# Patient Record
Sex: MALE | Race: WHITE
[De-identification: names, ages, dates, MRNs, and addresses within clinical notes are randomized per-mention and may not be internally consistent; named-entity substitution may affect disease eponyms.]

---

## 2019-08-03 ENCOUNTER — HOSPITAL ENCOUNTER (EMERGENCY)
Dept: HOSPITAL 83 - ED | Age: 29
Discharge: ANOTHER HOSPITAL | End: 2019-08-03
Payer: SELF-PAY

## 2019-08-03 ENCOUNTER — HOSPITAL ENCOUNTER (INPATIENT)
Age: 29
LOS: 3 days | Discharge: HOME | DRG: 637 | End: 2019-08-06
Attending: STUDENT IN AN ORGANIZED HEALTH CARE EDUCATION/TRAINING PROGRAM | Admitting: STUDENT IN AN ORGANIZED HEALTH CARE EDUCATION/TRAINING PROGRAM
Payer: MEDICARE

## 2019-08-03 ENCOUNTER — APPOINTMENT (OUTPATIENT)
Dept: GENERAL RADIOLOGY | Age: 29
DRG: 637 | End: 2019-08-03
Attending: STUDENT IN AN ORGANIZED HEALTH CARE EDUCATION/TRAINING PROGRAM
Payer: MEDICARE

## 2019-08-03 VITALS — SYSTOLIC BLOOD PRESSURE: 63 MMHG | DIASTOLIC BLOOD PRESSURE: 42 MMHG

## 2019-08-03 VITALS — DIASTOLIC BLOOD PRESSURE: 41 MMHG | SYSTOLIC BLOOD PRESSURE: 75 MMHG

## 2019-08-03 VITALS — SYSTOLIC BLOOD PRESSURE: 92 MMHG | DIASTOLIC BLOOD PRESSURE: 35 MMHG

## 2019-08-03 VITALS — DIASTOLIC BLOOD PRESSURE: 35 MMHG

## 2019-08-03 VITALS — DIASTOLIC BLOOD PRESSURE: 45 MMHG

## 2019-08-03 VITALS — DIASTOLIC BLOOD PRESSURE: 62 MMHG

## 2019-08-03 VITALS — SYSTOLIC BLOOD PRESSURE: 94 MMHG | DIASTOLIC BLOOD PRESSURE: 40 MMHG

## 2019-08-03 VITALS — HEIGHT: 67.99 IN

## 2019-08-03 VITALS — DIASTOLIC BLOOD PRESSURE: 31 MMHG

## 2019-08-03 DIAGNOSIS — E10.10 DKA, TYPE 1, NOT AT GOAL (HCC): Primary | ICD-10-CM

## 2019-08-03 DIAGNOSIS — E11.10: Primary | ICD-10-CM

## 2019-08-03 LAB
ACETAMINOPHEN LEVEL: <5 MCG/ML (ref 10–30)
ACETAMINOPHEN LEVEL: <5 MCG/ML (ref 10–30)
ALBUMIN SERPL-MCNC: 2.7 GM/DL (ref 3.1–4.5)
ALBUMIN SERPL-MCNC: 2.9 G/DL (ref 3.5–5.2)
ALP BLD-CCNC: 128 U/L (ref 40–129)
ALP SERPL-CCNC: 171 U/L (ref 45–117)
ALT SERPL W P-5'-P-CCNC: 30 U/L (ref 12–78)
ALT SERPL-CCNC: 21 U/L (ref 0–40)
AMPHETAMINES UR QL SCN: < 1000
ANION GAP SERPL CALCULATED.3IONS-SCNC: 32 MMOL/L (ref 7–16)
ANION GAP SERPL CALCULATED.3IONS-SCNC: 46 MMOL/L (ref 7–16)
APTT: 23.8 SEC (ref 24.5–35.1)
AST SERPL-CCNC: 23 U/L (ref 0–39)
AST SERPL-CCNC: 26 IU/L (ref 3–35)
B.E.: -22.4 MMOL/L (ref -3–3)
BACTERIA: ABNORMAL /HPF
BARBITURATES UR QL SCN: < 200
BASE EXCESS BLDA CALC-SCNC: -30.6 MMOL/L (ref -2–2)
BASOPHILS ABSOLUTE: 0.06 E9/L (ref 0–0.2)
BASOPHILS RELATIVE PERCENT: 0.3 % (ref 0–2)
BENZODIAZ UR QL SCN: < 200
BETA-HYDROXYBUTYRATE: >4.5 MMOL/L (ref 0.02–0.27)
BILIRUB SERPL-MCNC: <0.2 MG/DL (ref 0–1.2)
BILIRUBIN URINE: ABNORMAL
BLOOD, URINE: ABNORMAL
BUN BLDV-MCNC: 42 MG/DL (ref 6–20)
BUN BLDV-MCNC: 53 MG/DL (ref 6–20)
BUN SERPL-MCNC: 53 MG/DL (ref 7–24)
BURR CELLS BLD QL SMEAR: (no result)
BZE UR QL SCN: < 300
CALCIUM SERPL-MCNC: 6.7 MG/DL (ref 8.6–10.2)
CALCIUM SERPL-MCNC: 7.2 MG/DL (ref 8.6–10.2)
CANNABINOIDS UR QL SCN: < 50
CHLORIDE BLD-SCNC: 84 MMOL/L (ref 98–107)
CHLORIDE BLD-SCNC: 94 MMOL/L (ref 98–107)
CHLORIDE SERPL-SCNC: 65 MMOL/L (ref 98–107)
CHLORIDE URINE RANDOM: <20 MMOL/L
CK MB: 8.8 NG/ML (ref 0–7.7)
CLARITY: CLEAR
CO2: 15 MMOL/L (ref 22–29)
CO2: 4 MMOL/L (ref 22–29)
COHB: 0.1 % (ref 0–1.5)
COLOR: YELLOW
CREAT SERPL-MCNC: 2.3 MG/DL (ref 0.7–1.2)
CREAT SERPL-MCNC: 2.7 MG/DL (ref 0.7–1.2)
CREAT SERPL-MCNC: 3.81 MG/DL (ref 0.7–1.3)
CREATININE URINE: 24 MG/DL (ref 40–278)
CRITICAL: ABNORMAL
DATE ANALYZED: ABNORMAL
DATE OF COLLECTION: ABNORMAL
EOSINOPHILS ABSOLUTE: 0.06 E9/L (ref 0.05–0.5)
EOSINOPHILS RELATIVE PERCENT: 0.3 % (ref 0–6)
ERYTHROCYTE [DISTWIDTH] IN BLOOD BY AUTOMATED COUNT: 15 % (ref 0–14.5)
ETHANOL: <10 MG/DL (ref 0–0.08)
ETHANOL: <10 MG/DL (ref 0–0.08)
GFR AFRICAN AMERICAN: 33
GFR AFRICAN AMERICAN: 40
GFR NON-AFRICAN AMERICAN: 28 ML/MIN/1.73
GFR NON-AFRICAN AMERICAN: 33 ML/MIN/1.73
GLUCOSE BLD-MCNC: 1350 MG/DL (ref 74–99)
GLUCOSE BLD-MCNC: 537 MG/DL (ref 74–99)
GLUCOSE BLD-MCNC: 805 MG/DL (ref 74–99)
GLUCOSE URINE: >=1000 MG/DL
HCO3 BLDA-SCNC: 1.8 MMOL/L (ref 22–26)
HCO3: 5 MMOL/L (ref 22–26)
HCT VFR BLD AUTO: 42.2 % (ref 42–52)
HCT VFR BLD CALC: 33.3 % (ref 37–54)
HCT VFR BLD CALC: 37.1 % (ref 37–54)
HEMOGLOBIN: 11.2 G/DL (ref 12.5–16.5)
HEMOGLOBIN: 11.5 G/DL (ref 12.5–16.5)
HGB BLD-MCNC: 11.3 G/DL (ref 14–18)
HHB: 1.7 % (ref 0–5)
IMMATURE GRANULOCYTES #: 0.66 E9/L
IMMATURE GRANULOCYTES %: 3.8 % (ref 0–5)
INR BLD: 0.9
KETONES, URINE: 40 MG/DL
LAB: ABNORMAL
LACTIC ACID: 2.7 MMOL/L (ref 0.5–2.2)
LACTIC ACID: 3.7 MMOL/L (ref 0.5–2.2)
LEUKOCYTE ESTERASE, URINE: NEGATIVE
LYMPHOCYTES ABSOLUTE: 2.95 E9/L (ref 1.5–4)
LYMPHOCYTES RELATIVE PERCENT: 16.9 % (ref 20–42)
Lab: ABNORMAL
MACROCYTES BLD QL SMEAR: (no result)
MAGNESIUM: 2.5 MG/DL (ref 1.6–2.6)
MCH RBC QN AUTO: 29.1 PG (ref 26–35)
MCH RBC QN AUTO: 29.2 PG (ref 26–35)
MCH RBC QN AUTO: 29.9 PG (ref 27–31)
MCHC RBC AUTO-ENTMCNC: 26.8 G/DL (ref 33–37)
MCHC RBC AUTO-ENTMCNC: 31 % (ref 32–34.5)
MCHC RBC AUTO-ENTMCNC: 33.6 % (ref 32–34.5)
MCV RBC AUTO: 111.6 FL (ref 80–94)
MCV RBC AUTO: 86.9 FL (ref 80–99.9)
MCV RBC AUTO: 93.9 FL (ref 80–99.9)
METER GLUCOSE: 421 MG/DL (ref 74–99)
METER GLUCOSE: >500 MG/DL (ref 74–99)
METHADONE UR QL SCN: < 300
METHB: 0.3 % (ref 0–1.5)
MODE: ABNORMAL
MONOCYTES ABSOLUTE: 0.91 E9/L (ref 0.1–0.95)
MONOCYTES RELATIVE PERCENT: 5.2 % (ref 2–12)
NEUTROPHILS ABSOLUTE: 12.82 E9/L (ref 1.8–7.3)
NEUTROPHILS RELATIVE PERCENT: 73.5 % (ref 43–80)
NITRITE, URINE: NEGATIVE
NRBC BLD QL AUTO: 0 10*3/UL (ref 0–0)
O2 SATURATION: 98.3 % (ref 92–98.5)
O2HB: 97.9 % (ref 94–97)
OPERATOR ID: ABNORMAL
OPIATES UR QL SCN: < 300
OSMOLALITY: 369 MOSM/KG (ref 285–310)
PATIENT TEMP: 37 C
PCO2 BLDA: 9 MMHG (ref 35–45)
PCO2: 15.9 MMHG (ref 35–45)
PCP UR QL SCN: <  25
PDW BLD-RTO: 14.6 FL (ref 11.5–15)
PDW BLD-RTO: 14.6 FL (ref 11.5–15)
PH BLDA: 6.92 [PH] (ref 7.35–7.45)
PH BLOOD GAS: 7.11 (ref 7.35–7.45)
PH UA: 5 (ref 5–9)
PHOSPHORUS: 1.9 MG/DL (ref 2.5–4.5)
PHOSPHORUS: 6.7 MG/DL (ref 2.5–4.5)
PLATELET # BLD AUTO: 488 10*3/UL (ref 130–400)
PLATELET # BLD: 382 E9/L (ref 130–450)
PLATELET # BLD: 396 E9/L (ref 130–450)
PLATELET SUFFICIENCY: (no result)
PMV BLD AUTO: 10.7 FL (ref 7–12)
PMV BLD AUTO: 11.3 FL (ref 7–12)
PMV BLD AUTO: 11.8 FL (ref 9.6–12.3)
PO2 BLDA: 156 MMHG (ref 80–90)
PO2: 141.1 MMHG (ref 60–100)
POTASSIUM REFLEX MAGNESIUM: 4.1 MMOL/L (ref 3.5–5)
POTASSIUM SERPL-SCNC: 3.7 MMOL/L (ref 3.5–5)
POTASSIUM SERPL-SCNC: 6.6 MMOL/L (ref 3.5–5.1)
POTASSIUM, UR: 16.6 MMOL/L
PROCALCITONIN: 7.88 NG/ML (ref 0–0.08)
PROT SERPL-MCNC: 6.4 GM/DL (ref 6.4–8.2)
PROTEIN UA: ABNORMAL MG/DL
PROTHROMBIN TIME: 10 SEC (ref 9.3–12.4)
RBC # BLD AUTO: 3.78 10*6/UL (ref 4.5–5.9)
RBC # BLD: 3.83 E12/L (ref 3.8–5.8)
RBC # BLD: 3.95 E12/L (ref 3.8–5.8)
RBC # BLD: NORMAL 10*6/UL
RBC UA: ABNORMAL /HPF (ref 0–2)
SALICYLATE, SERUM: <0.3 MG/DL (ref 0–30)
SALICYLATE, SERUM: <0.3 MG/DL (ref 0–30)
SAO2 % BLDA: 98.7 % (ref 95–97)
SODIUM BLD-SCNC: 134 MMOL/L (ref 132–146)
SODIUM BLD-SCNC: 141 MMOL/L (ref 132–146)
SODIUM SERPL-SCNC: 115 MMOL/L (ref 136–145)
SODIUM URINE: 22 MMOL/L
SOURCE, BLOOD GAS: ABNORMAL
SPECIFIC GRAVITY UA: 1.01 (ref 1–1.03)
THB: 12.4 G/DL (ref 11.5–16.5)
TIME ANALYZED: 1723
TOTAL CELLS COUNTED: 100 #CELLS
TOTAL CK: 162 U/L (ref 20–200)
TOTAL CK: 234 U/L (ref 20–200)
TOTAL PROTEIN: 5.9 G/DL (ref 6.4–8.3)
TRICYCLIC ANTIDEPRESSANTS SCREEN SERUM: NEGATIVE NG/ML
TRICYCLIC ANTIDEPRESSANTS SCREEN SERUM: NEGATIVE NG/ML
TROPONIN: <0.01 NG/ML (ref 0–0.03)
UREA NITROGEN, UR: 150 MG/DL (ref 800–1666)
UROBILINOGEN, URINE: 0.2 E.U./DL
WBC # BLD: 17.5 E9/L (ref 4.5–11.5)
WBC # BLD: 24.1 E9/L (ref 4.5–11.5)
WBC NRBC COR # BLD AUTO: 29.9 10*3/UL (ref 4.8–10.8)
WBC UA: ABNORMAL /HPF (ref 0–5)

## 2019-08-03 PROCEDURE — 71045 X-RAY EXAM CHEST 1 VIEW: CPT

## 2019-08-03 PROCEDURE — 6360000002 HC RX W HCPCS

## 2019-08-03 PROCEDURE — 84145 PROCALCITONIN (PCT): CPT

## 2019-08-03 PROCEDURE — 82553 CREATINE MB FRACTION: CPT

## 2019-08-03 PROCEDURE — 84133 ASSAY OF URINE POTASSIUM: CPT

## 2019-08-03 PROCEDURE — G0480 DRUG TEST DEF 1-7 CLASSES: HCPCS

## 2019-08-03 PROCEDURE — 82550 ASSAY OF CK (CPK): CPT

## 2019-08-03 PROCEDURE — 80053 COMPREHEN METABOLIC PANEL: CPT

## 2019-08-03 PROCEDURE — 83605 ASSAY OF LACTIC ACID: CPT

## 2019-08-03 PROCEDURE — 85027 COMPLETE CBC AUTOMATED: CPT

## 2019-08-03 PROCEDURE — 85610 PROTHROMBIN TIME: CPT

## 2019-08-03 PROCEDURE — 87081 CULTURE SCREEN ONLY: CPT

## 2019-08-03 PROCEDURE — 6370000000 HC RX 637 (ALT 250 FOR IP): Performed by: INTERNAL MEDICINE

## 2019-08-03 PROCEDURE — 2580000003 HC RX 258: Performed by: INTERNAL MEDICINE

## 2019-08-03 PROCEDURE — 82947 ASSAY GLUCOSE BLOOD QUANT: CPT

## 2019-08-03 PROCEDURE — 80048 BASIC METABOLIC PNL TOTAL CA: CPT

## 2019-08-03 PROCEDURE — 82010 KETONE BODYS QUAN: CPT

## 2019-08-03 PROCEDURE — 36556 INSERT NON-TUNNEL CV CATH: CPT

## 2019-08-03 PROCEDURE — 82805 BLOOD GASES W/O2 SATURATION: CPT

## 2019-08-03 PROCEDURE — 6360000002 HC RX W HCPCS: Performed by: INTERNAL MEDICINE

## 2019-08-03 PROCEDURE — 84484 ASSAY OF TROPONIN QUANT: CPT

## 2019-08-03 PROCEDURE — 84300 ASSAY OF URINE SODIUM: CPT

## 2019-08-03 PROCEDURE — 82962 GLUCOSE BLOOD TEST: CPT

## 2019-08-03 PROCEDURE — 51702 INSERT TEMP BLADDER CATH: CPT

## 2019-08-03 PROCEDURE — 87040 BLOOD CULTURE FOR BACTERIA: CPT

## 2019-08-03 PROCEDURE — 83735 ASSAY OF MAGNESIUM: CPT

## 2019-08-03 PROCEDURE — 83930 ASSAY OF BLOOD OSMOLALITY: CPT

## 2019-08-03 PROCEDURE — 85025 COMPLETE CBC W/AUTO DIFF WBC: CPT

## 2019-08-03 PROCEDURE — 84540 ASSAY OF URINE/UREA-N: CPT

## 2019-08-03 PROCEDURE — 2500000003 HC RX 250 WO HCPCS: Performed by: INTERNAL MEDICINE

## 2019-08-03 PROCEDURE — 36415 COLL VENOUS BLD VENIPUNCTURE: CPT

## 2019-08-03 PROCEDURE — C9113 INJ PANTOPRAZOLE SODIUM, VIA: HCPCS | Performed by: INTERNAL MEDICINE

## 2019-08-03 PROCEDURE — 2000000000 HC ICU R&B

## 2019-08-03 PROCEDURE — 93005 ELECTROCARDIOGRAM TRACING: CPT | Performed by: INTERNAL MEDICINE

## 2019-08-03 PROCEDURE — 82570 ASSAY OF URINE CREATININE: CPT

## 2019-08-03 PROCEDURE — 85730 THROMBOPLASTIN TIME PARTIAL: CPT

## 2019-08-03 PROCEDURE — 80307 DRUG TEST PRSMV CHEM ANLYZR: CPT

## 2019-08-03 PROCEDURE — 82436 ASSAY OF URINE CHLORIDE: CPT

## 2019-08-03 PROCEDURE — 87088 URINE BACTERIA CULTURE: CPT

## 2019-08-03 PROCEDURE — 81001 URINALYSIS AUTO W/SCOPE: CPT

## 2019-08-03 PROCEDURE — 02HV33Z INSERTION OF INFUSION DEVICE INTO SUPERIOR VENA CAVA, PERCUTANEOUS APPROACH: ICD-10-PCS | Performed by: INTERNAL MEDICINE

## 2019-08-03 PROCEDURE — 36592 COLLECT BLOOD FROM PICC: CPT

## 2019-08-03 PROCEDURE — 82693 ASSAY OF ETHYLENE GLYCOL: CPT

## 2019-08-03 PROCEDURE — 84100 ASSAY OF PHOSPHORUS: CPT

## 2019-08-03 RX ORDER — MIDAZOLAM HYDROCHLORIDE 1 MG/ML
2 INJECTION INTRAMUSCULAR; INTRAVENOUS ONCE
Status: COMPLETED | OUTPATIENT
Start: 2019-08-03 | End: 2019-08-03

## 2019-08-03 RX ORDER — SODIUM CHLORIDE 9 MG/ML
INJECTION, SOLUTION INTRAVENOUS CONTINUOUS
Status: DISCONTINUED | OUTPATIENT
Start: 2019-08-03 | End: 2019-08-04

## 2019-08-03 RX ORDER — HEPARIN SODIUM 10000 [USP'U]/ML
5000 INJECTION, SOLUTION INTRAVENOUS; SUBCUTANEOUS EVERY 8 HOURS
Status: DISCONTINUED | OUTPATIENT
Start: 2019-08-03 | End: 2019-08-06 | Stop reason: HOSPADM

## 2019-08-03 RX ORDER — SODIUM CHLORIDE 0.9 % (FLUSH) 0.9 %
10 SYRINGE (ML) INJECTION PRN
Status: DISCONTINUED | OUTPATIENT
Start: 2019-08-03 | End: 2019-08-06 | Stop reason: HOSPADM

## 2019-08-03 RX ORDER — MIDAZOLAM HYDROCHLORIDE 1 MG/ML
INJECTION INTRAMUSCULAR; INTRAVENOUS
Status: COMPLETED
Start: 2019-08-03 | End: 2019-08-03

## 2019-08-03 RX ORDER — DEXTROSE MONOHYDRATE 25 G/50ML
12.5 INJECTION, SOLUTION INTRAVENOUS PRN
Status: DISCONTINUED | OUTPATIENT
Start: 2019-08-03 | End: 2019-08-05

## 2019-08-03 RX ORDER — POTASSIUM CHLORIDE 7.45 MG/ML
10 INJECTION INTRAVENOUS PRN
Status: DISCONTINUED | OUTPATIENT
Start: 2019-08-03 | End: 2019-08-05

## 2019-08-03 RX ORDER — SODIUM CHLORIDE 0.9 % (FLUSH) 0.9 %
10 SYRINGE (ML) INJECTION EVERY 12 HOURS SCHEDULED
Status: DISCONTINUED | OUTPATIENT
Start: 2019-08-03 | End: 2019-08-06 | Stop reason: HOSPADM

## 2019-08-03 RX ORDER — DEXTROSE AND SODIUM CHLORIDE 5; .45 G/100ML; G/100ML
INJECTION, SOLUTION INTRAVENOUS CONTINUOUS PRN
Status: DISCONTINUED | OUTPATIENT
Start: 2019-08-03 | End: 2019-08-05

## 2019-08-03 RX ORDER — PANTOPRAZOLE SODIUM 40 MG/10ML
40 INJECTION, POWDER, LYOPHILIZED, FOR SOLUTION INTRAVENOUS DAILY
Status: DISCONTINUED | OUTPATIENT
Start: 2019-08-03 | End: 2019-08-05

## 2019-08-03 RX ORDER — LIDOCAINE HYDROCHLORIDE 20 MG/ML
JELLY TOPICAL PRN
Status: DISCONTINUED | OUTPATIENT
Start: 2019-08-03 | End: 2019-08-06 | Stop reason: HOSPADM

## 2019-08-03 RX ORDER — 0.9 % SODIUM CHLORIDE 0.9 %
10 VIAL (ML) INJECTION DAILY
Status: DISCONTINUED | OUTPATIENT
Start: 2019-08-03 | End: 2019-08-05

## 2019-08-03 RX ORDER — MAGNESIUM SULFATE 1 G/100ML
1 INJECTION INTRAVENOUS PRN
Status: DISCONTINUED | OUTPATIENT
Start: 2019-08-03 | End: 2019-08-05

## 2019-08-03 RX ORDER — ONDANSETRON 2 MG/ML
4 INJECTION INTRAMUSCULAR; INTRAVENOUS EVERY 6 HOURS PRN
Status: DISCONTINUED | OUTPATIENT
Start: 2019-08-03 | End: 2019-08-06 | Stop reason: HOSPADM

## 2019-08-03 RX ADMIN — CALCIUM GLUCONATE 1 G: 98 INJECTION, SOLUTION INTRAVENOUS at 20:41

## 2019-08-03 RX ADMIN — SODIUM CHLORIDE 15 UNITS/HR: 9 INJECTION, SOLUTION INTRAVENOUS at 18:35

## 2019-08-03 RX ADMIN — SODIUM PHOSPHATE, MONOBASIC, MONOHYDRATE 15 MMOL: 276; 142 INJECTION, SOLUTION INTRAVENOUS at 21:35

## 2019-08-03 RX ADMIN — POTASSIUM CHLORIDE 10 MEQ: 7.46 INJECTION, SOLUTION INTRAVENOUS at 19:16

## 2019-08-03 RX ADMIN — POTASSIUM CHLORIDE 10 MEQ: 7.46 INJECTION, SOLUTION INTRAVENOUS at 22:28

## 2019-08-03 RX ADMIN — ONDANSETRON HYDROCHLORIDE 4 MG: 2 SOLUTION INTRAMUSCULAR; INTRAVENOUS at 18:26

## 2019-08-03 RX ADMIN — PANTOPRAZOLE SODIUM 40 MG: 40 INJECTION, POWDER, FOR SOLUTION INTRAVENOUS at 18:37

## 2019-08-03 RX ADMIN — SODIUM CHLORIDE: 9 INJECTION, SOLUTION INTRAVENOUS at 18:16

## 2019-08-03 RX ADMIN — HEPARIN SODIUM 5000 UNITS: 10000 INJECTION INTRAVENOUS; SUBCUTANEOUS at 19:43

## 2019-08-03 RX ADMIN — POTASSIUM CHLORIDE 10 MEQ: 7.46 INJECTION, SOLUTION INTRAVENOUS at 23:05

## 2019-08-03 RX ADMIN — Medication 10 ML: at 19:39

## 2019-08-03 RX ADMIN — MIDAZOLAM 2 MG: 1 INJECTION INTRAMUSCULAR; INTRAVENOUS at 19:43

## 2019-08-03 RX ADMIN — SODIUM CHLORIDE: 9 INJECTION, SOLUTION INTRAVENOUS at 20:20

## 2019-08-03 RX ADMIN — POTASSIUM CHLORIDE 10 MEQ: 7.46 INJECTION, SOLUTION INTRAVENOUS at 18:13

## 2019-08-03 RX ADMIN — MIDAZOLAM 2 MG: 1 INJECTION INTRAMUSCULAR; INTRAVENOUS at 19:38

## 2019-08-03 RX ADMIN — MIDAZOLAM HYDROCHLORIDE 2 MG: 1 INJECTION INTRAMUSCULAR; INTRAVENOUS at 19:43

## 2019-08-03 RX ADMIN — Medication 10 ML: at 18:38

## 2019-08-03 RX ADMIN — MIDAZOLAM HYDROCHLORIDE 2 MG: 1 INJECTION INTRAMUSCULAR; INTRAVENOUS at 19:38

## 2019-08-03 RX ADMIN — LIDOCAINE HYDROCHLORIDE: 20 JELLY TOPICAL at 19:40

## 2019-08-03 RX ADMIN — POTASSIUM CHLORIDE 10 MEQ: 7.46 INJECTION, SOLUTION INTRAVENOUS at 20:20

## 2019-08-03 RX ADMIN — POTASSIUM CHLORIDE 10 MEQ: 7.46 INJECTION, SOLUTION INTRAVENOUS at 21:49

## 2019-08-03 RX ADMIN — AMPICILLIN SODIUM AND SULBACTAM SODIUM 3 G: 2; 1 INJECTION, POWDER, FOR SOLUTION INTRAMUSCULAR; INTRAVENOUS at 23:02

## 2019-08-03 ASSESSMENT — PAIN SCALES - GENERAL
PAINLEVEL_OUTOF10: 0
PAINLEVEL_OUTOF10: 0

## 2019-08-03 NOTE — H&P
found for: CKTOTAL, CKMB, CKMBINDEX, TROPONINI  PT/INR:  No results found for: PROTIME, INR  ABG:  No results found for: PHART, ORX1KQL, PO2ART, E4WRSYNW, CJH2UEC, BEART  TSH:  No results found for: TSH     Notable Cultures:       Culture  Date sent  Result    Nasal      Sputum      Body Fluid      Urine Strep pneumonia Ag        Urine Legionella Ag        Blood        Urine          Antibiotic  Days  Day started                                       Oxygen:   Nasal cannula L/min     Face mask %     Reservoirs mask %       ABG   Vent Settings     PH   Mode      PCO2   TV      PO2   RR      HCO3   PS      Sat%   PEEP      FIO2   FIO2        P/F          Lines:  Site  Day  Date inserted     TLC              PICC              Arterial line              Peripheral line                           DVT Prophylaxis      Heparin         Enoxaparin        PCDs        Imaging studies:   Imaging  Date  Result    CXR                       EKG:     Resident's Assessment & Plan     Neurology    Acute Encephalopathy 2/2 DKA, Drug ? ? Infection ? ?     - Metabolic cause: Likely DKA 2/2 insulin non compliance (not on insulin for 7 days)    - Infectious cause >> will be worked up and ruled out   - Structural cause >> Less likely cause at this moment   - Toxic causes >> urine drug screen ordered   -Positive history of Cocaine     Cardiovascular     - History of Chest Pain   - Not a high risk patient >> 2/2 to cocaine ? ? Pulmonary    Possible Aspiration Pneumonia ? ?      - CXR >> reticular infiltrates on the right side   - WBC >> 20 >> 17.    - Possible Alcohol intoxication    - Check procalcitonin    - Follow with CXR    - Follow ABG     Renal    HAGMA 2/2 DKA     - 2/2 to insulin non-complaince    - Check Serum osmolarity and Osmolar Gap    - Check for Methanol and Ethanol intoxication    Hyperkalemia     - Ca Gluconate was given e   - 2/2 to Low Insulin and shift    Endocrine    DKA 2/2 Insulin non compliance in a Type I DM      - h/o type I DM on Insulin at home   - 2/2 noncompliance/MI? ? /infection? ?/insuffient insulin   - check EKG and troponin    - Order Total CK and CK-MP    - Started on DKA protocol: insulin drip   - BMP, mag and phos q4h   - bridge when AG closes   - Give HCO3 if Ph < 6.9    - K if it drops to less than 3.5    - Blood glucose level was in the 1800 when presented to [facility]. Infectious Disease     - Urine Culture pending    - MRSA screening pending    - Lactic acid >> 2.7    Alcohol Intoxication/Withdrawal      - Possible intoxication on exam with a positive history of up to 8 drinks a day   - Intoxication symptoms might be masked by the DKA symptoms    - Watch alcohol withdrawal symptoms on day 3.    - Consider CIWA protocol        DVT/GI prophylaxis  ON heparin 5000/ Protonix 40 mg        Claudia Dai M.D. , PGY-1  Internal Medicine    Attending Physician: Dr. Kanika Infante, DO    Senior Resident Statement    I have seen and examined the patient with the intern. I have discussed the case, including pertinent history and exam findings with the intern. I agree with the assessment, plan and orders as documented by the intern. I have also discussed the plan with the attending on call, Dr. Dariel Preston.     Joie Ozuna MD, PGY 3  Attending physician: Dr. Dariel Preston

## 2019-08-04 ENCOUNTER — APPOINTMENT (OUTPATIENT)
Dept: GENERAL RADIOLOGY | Age: 29
DRG: 637 | End: 2019-08-04
Attending: STUDENT IN AN ORGANIZED HEALTH CARE EDUCATION/TRAINING PROGRAM
Payer: MEDICARE

## 2019-08-04 LAB
AMPHETAMINE SCREEN, URINE: NOT DETECTED
ANION GAP SERPL CALCULATED.3IONS-SCNC: 10 MMOL/L (ref 7–16)
ANION GAP SERPL CALCULATED.3IONS-SCNC: 12 MMOL/L (ref 7–16)
ANION GAP SERPL CALCULATED.3IONS-SCNC: 21 MMOL/L (ref 7–16)
ANION GAP SERPL CALCULATED.3IONS-SCNC: 4 MMOL/L (ref 7–16)
ANION GAP SERPL CALCULATED.3IONS-SCNC: 9 MMOL/L (ref 7–16)
ANISOCYTOSIS: ABNORMAL
BARBITURATE SCREEN URINE: NOT DETECTED
BASOPHILS ABSOLUTE: 0 E9/L (ref 0–0.2)
BASOPHILS RELATIVE PERCENT: 0.1 % (ref 0–2)
BENZODIAZEPINE SCREEN, URINE: POSITIVE
BUN BLDV-MCNC: 11 MG/DL (ref 6–20)
BUN BLDV-MCNC: 17 MG/DL (ref 6–20)
BUN BLDV-MCNC: 25 MG/DL (ref 6–20)
BUN BLDV-MCNC: 29 MG/DL (ref 6–20)
BUN BLDV-MCNC: 35 MG/DL (ref 6–20)
CALCIUM SERPL-MCNC: 7.1 MG/DL (ref 8.6–10.2)
CALCIUM SERPL-MCNC: 7.2 MG/DL (ref 8.6–10.2)
CALCIUM SERPL-MCNC: 7.3 MG/DL (ref 8.6–10.2)
CALCIUM SERPL-MCNC: 7.5 MG/DL (ref 8.6–10.2)
CALCIUM SERPL-MCNC: 7.8 MG/DL (ref 8.6–10.2)
CANNABINOID SCREEN URINE: POSITIVE
CHLORIDE BLD-SCNC: 103 MMOL/L (ref 98–107)
CHLORIDE BLD-SCNC: 105 MMOL/L (ref 98–107)
CHLORIDE BLD-SCNC: 106 MMOL/L (ref 98–107)
CHLORIDE BLD-SCNC: 111 MMOL/L (ref 98–107)
CHLORIDE BLD-SCNC: 113 MMOL/L (ref 98–107)
CO2: 21 MMOL/L (ref 22–29)
CO2: 26 MMOL/L (ref 22–29)
CO2: 26 MMOL/L (ref 22–29)
CO2: 30 MMOL/L (ref 22–29)
CO2: 31 MMOL/L (ref 22–29)
COCAINE METABOLITE SCREEN URINE: POSITIVE
CREAT SERPL-MCNC: 0.9 MG/DL (ref 0.7–1.2)
CREAT SERPL-MCNC: 1 MG/DL (ref 0.7–1.2)
CREAT SERPL-MCNC: 1.2 MG/DL (ref 0.7–1.2)
CREAT SERPL-MCNC: 1.3 MG/DL (ref 0.7–1.2)
CREAT SERPL-MCNC: 1.7 MG/DL (ref 0.7–1.2)
EKG ATRIAL RATE: 114 BPM
EKG P AXIS: 46 DEGREES
EKG P-R INTERVAL: 120 MS
EKG Q-T INTERVAL: 340 MS
EKG QRS DURATION: 90 MS
EKG QTC CALCULATION (BAZETT): 468 MS
EKG R AXIS: 36 DEGREES
EKG T AXIS: 16 DEGREES
EKG VENTRICULAR RATE: 114 BPM
EOSINOPHILS ABSOLUTE: 0 E9/L (ref 0.05–0.5)
EOSINOPHILS RELATIVE PERCENT: 0.1 % (ref 0–6)
GFR AFRICAN AMERICAN: 57
GFR AFRICAN AMERICAN: >60
GFR NON-AFRICAN AMERICAN: 47 ML/MIN/1.73
GFR NON-AFRICAN AMERICAN: >60 ML/MIN/1.73
GLUCOSE BLD-MCNC: 172 MG/DL (ref 74–99)
GLUCOSE BLD-MCNC: 252 MG/DL (ref 74–99)
GLUCOSE BLD-MCNC: 255 MG/DL (ref 74–99)
GLUCOSE BLD-MCNC: 375 MG/DL (ref 74–99)
GLUCOSE BLD-MCNC: 441 MG/DL (ref 74–99)
HCT VFR BLD CALC: 30.7 % (ref 37–54)
HEMOGLOBIN: 10.6 G/DL (ref 12.5–16.5)
LACTIC ACID: 1.3 MMOL/L (ref 0.5–2.2)
LACTIC ACID: 1.8 MMOL/L (ref 0.5–2.2)
LYMPHOCYTES ABSOLUTE: 1.36 E9/L (ref 1.5–4)
LYMPHOCYTES RELATIVE PERCENT: 10.4 % (ref 20–42)
MAGNESIUM: 2 MG/DL (ref 1.6–2.6)
MAGNESIUM: 2.1 MG/DL (ref 1.6–2.6)
MAGNESIUM: 2.4 MG/DL (ref 1.6–2.6)
MCH RBC QN AUTO: 28.9 PG (ref 26–35)
MCHC RBC AUTO-ENTMCNC: 34.5 % (ref 32–34.5)
MCV RBC AUTO: 83.7 FL (ref 80–99.9)
METER GLUCOSE: 143 MG/DL (ref 74–99)
METER GLUCOSE: 157 MG/DL (ref 74–99)
METER GLUCOSE: 168 MG/DL (ref 74–99)
METER GLUCOSE: 172 MG/DL (ref 74–99)
METER GLUCOSE: 221 MG/DL (ref 74–99)
METER GLUCOSE: 221 MG/DL (ref 74–99)
METER GLUCOSE: 233 MG/DL (ref 74–99)
METER GLUCOSE: 258 MG/DL (ref 74–99)
METER GLUCOSE: 274 MG/DL (ref 74–99)
METER GLUCOSE: 331 MG/DL (ref 74–99)
METER GLUCOSE: 332 MG/DL (ref 74–99)
METER GLUCOSE: 335 MG/DL (ref 74–99)
METER GLUCOSE: 346 MG/DL (ref 74–99)
METER GLUCOSE: 354 MG/DL (ref 74–99)
METER GLUCOSE: 416 MG/DL (ref 74–99)
METHADONE SCREEN, URINE: NOT DETECTED
MONOCYTES ABSOLUTE: 0.54 E9/L (ref 0.1–0.95)
MONOCYTES RELATIVE PERCENT: 3.5 % (ref 2–12)
NEUTROPHILS ABSOLUTE: 11.7 E9/L (ref 1.8–7.3)
NEUTROPHILS RELATIVE PERCENT: 86.1 % (ref 43–80)
OPIATE SCREEN URINE: NOT DETECTED
OVALOCYTES: ABNORMAL
PDW BLD-RTO: 14.7 FL (ref 11.5–15)
PHENCYCLIDINE SCREEN URINE: NOT DETECTED
PHOSPHORUS: 2.3 MG/DL (ref 2.5–4.5)
PHOSPHORUS: 2.5 MG/DL (ref 2.5–4.5)
PHOSPHORUS: 2.5 MG/DL (ref 2.5–4.5)
PLATELET # BLD: 346 E9/L (ref 130–450)
PMV BLD AUTO: 10.2 FL (ref 7–12)
POIKILOCYTES: ABNORMAL
POTASSIUM SERPL-SCNC: 3.8 MMOL/L (ref 3.5–5)
POTASSIUM SERPL-SCNC: 3.8 MMOL/L (ref 3.5–5)
POTASSIUM SERPL-SCNC: 4.2 MMOL/L (ref 3.5–5)
PROPOXYPHENE SCREEN: NOT DETECTED
RBC # BLD: 3.67 E12/L (ref 3.8–5.8)
REPORT: NORMAL
REPORT: NORMAL
SODIUM BLD-SCNC: 139 MMOL/L (ref 132–146)
SODIUM BLD-SCNC: 139 MMOL/L (ref 132–146)
SODIUM BLD-SCNC: 148 MMOL/L (ref 132–146)
SODIUM BLD-SCNC: 151 MMOL/L (ref 132–146)
SODIUM BLD-SCNC: 151 MMOL/L (ref 132–146)
TARGET CELLS: ABNORMAL
TROPONIN: <0.01 NG/ML (ref 0–0.03)
TROPONIN: <0.01 NG/ML (ref 0–0.03)
WBC # BLD: 13.6 E9/L (ref 4.5–11.5)

## 2019-08-04 PROCEDURE — 2580000003 HC RX 258: Performed by: INTERNAL MEDICINE

## 2019-08-04 PROCEDURE — 6370000000 HC RX 637 (ALT 250 FOR IP): Performed by: INTERNAL MEDICINE

## 2019-08-04 PROCEDURE — 2500000003 HC RX 250 WO HCPCS: Performed by: INTERNAL MEDICINE

## 2019-08-04 PROCEDURE — 6360000002 HC RX W HCPCS: Performed by: INTERNAL MEDICINE

## 2019-08-04 PROCEDURE — C9113 INJ PANTOPRAZOLE SODIUM, VIA: HCPCS | Performed by: INTERNAL MEDICINE

## 2019-08-04 PROCEDURE — 71045 X-RAY EXAM CHEST 1 VIEW: CPT

## 2019-08-04 PROCEDURE — G0480 DRUG TEST DEF 1-7 CLASSES: HCPCS

## 2019-08-04 PROCEDURE — 93010 ELECTROCARDIOGRAM REPORT: CPT | Performed by: INTERNAL MEDICINE

## 2019-08-04 PROCEDURE — 84484 ASSAY OF TROPONIN QUANT: CPT

## 2019-08-04 PROCEDURE — 80074 ACUTE HEPATITIS PANEL: CPT

## 2019-08-04 PROCEDURE — 82962 GLUCOSE BLOOD TEST: CPT

## 2019-08-04 PROCEDURE — 85025 COMPLETE CBC W/AUTO DIFF WBC: CPT

## 2019-08-04 PROCEDURE — 2000000000 HC ICU R&B

## 2019-08-04 PROCEDURE — 36592 COLLECT BLOOD FROM PICC: CPT

## 2019-08-04 PROCEDURE — 80307 DRUG TEST PRSMV CHEM ANLYZR: CPT

## 2019-08-04 PROCEDURE — 86703 HIV-1/HIV-2 1 RESULT ANTBDY: CPT

## 2019-08-04 PROCEDURE — 6360000002 HC RX W HCPCS

## 2019-08-04 PROCEDURE — 80048 BASIC METABOLIC PNL TOTAL CA: CPT

## 2019-08-04 PROCEDURE — 36415 COLL VENOUS BLD VENIPUNCTURE: CPT

## 2019-08-04 PROCEDURE — 84100 ASSAY OF PHOSPHORUS: CPT

## 2019-08-04 PROCEDURE — 83605 ASSAY OF LACTIC ACID: CPT

## 2019-08-04 PROCEDURE — 83735 ASSAY OF MAGNESIUM: CPT

## 2019-08-04 RX ORDER — THIAMINE HYDROCHLORIDE 100 MG/ML
100 INJECTION, SOLUTION INTRAMUSCULAR; INTRAVENOUS DAILY
Status: DISCONTINUED | OUTPATIENT
Start: 2019-08-04 | End: 2019-08-05

## 2019-08-04 RX ORDER — DEXTROSE MONOHYDRATE 25 G/50ML
12.5 INJECTION, SOLUTION INTRAVENOUS PRN
Status: DISCONTINUED | OUTPATIENT
Start: 2019-08-04 | End: 2019-08-06 | Stop reason: HOSPADM

## 2019-08-04 RX ORDER — NICOTINE POLACRILEX 4 MG
15 LOZENGE BUCCAL PRN
Status: DISCONTINUED | OUTPATIENT
Start: 2019-08-04 | End: 2019-08-06 | Stop reason: HOSPADM

## 2019-08-04 RX ORDER — DEXTROSE AND SODIUM CHLORIDE 5; .45 G/100ML; G/100ML
INJECTION, SOLUTION INTRAVENOUS CONTINUOUS
Status: DISCONTINUED | OUTPATIENT
Start: 2019-08-04 | End: 2019-08-04

## 2019-08-04 RX ORDER — DEXTROSE MONOHYDRATE 50 MG/ML
100 INJECTION, SOLUTION INTRAVENOUS PRN
Status: DISCONTINUED | OUTPATIENT
Start: 2019-08-04 | End: 2019-08-06 | Stop reason: HOSPADM

## 2019-08-04 RX ORDER — DEXTROSE MONOHYDRATE 50 MG/ML
INJECTION, SOLUTION INTRAVENOUS CONTINUOUS
Status: DISCONTINUED | OUTPATIENT
Start: 2019-08-04 | End: 2019-08-05

## 2019-08-04 RX ORDER — INSULIN GLARGINE 100 [IU]/ML
20 INJECTION, SOLUTION SUBCUTANEOUS NIGHTLY
Status: DISCONTINUED | OUTPATIENT
Start: 2019-08-04 | End: 2019-08-05

## 2019-08-04 RX ORDER — POTASSIUM CHLORIDE 7.45 MG/ML
INJECTION INTRAVENOUS
Status: COMPLETED
Start: 2019-08-04 | End: 2019-08-04

## 2019-08-04 RX ORDER — SODIUM CHLORIDE 450 MG/100ML
INJECTION, SOLUTION INTRAVENOUS CONTINUOUS
Status: DISCONTINUED | OUTPATIENT
Start: 2019-08-04 | End: 2019-08-05

## 2019-08-04 RX ADMIN — POTASSIUM CHLORIDE 10 MEQ: 7.46 INJECTION, SOLUTION INTRAVENOUS at 09:13

## 2019-08-04 RX ADMIN — PANTOPRAZOLE SODIUM 40 MG: 40 INJECTION, POWDER, FOR SOLUTION INTRAVENOUS at 08:09

## 2019-08-04 RX ADMIN — SODIUM CHLORIDE: 9 INJECTION, SOLUTION INTRAVENOUS at 01:14

## 2019-08-04 RX ADMIN — SODIUM CHLORIDE 12.68 UNITS/HR: 9 INJECTION, SOLUTION INTRAVENOUS at 03:10

## 2019-08-04 RX ADMIN — HEPARIN SODIUM 5000 UNITS: 10000 INJECTION INTRAVENOUS; SUBCUTANEOUS at 18:43

## 2019-08-04 RX ADMIN — Medication 10 ML: at 08:09

## 2019-08-04 RX ADMIN — SODIUM PHOSPHATE, MONOBASIC, MONOHYDRATE 10 MMOL: 276; 142 INJECTION, SOLUTION INTRAVENOUS at 01:45

## 2019-08-04 RX ADMIN — ONDANSETRON HYDROCHLORIDE 4 MG: 2 SOLUTION INTRAMUSCULAR; INTRAVENOUS at 06:29

## 2019-08-04 RX ADMIN — POTASSIUM CHLORIDE 10 MEQ: 7.46 INJECTION, SOLUTION INTRAVENOUS at 07:09

## 2019-08-04 RX ADMIN — ONDANSETRON HYDROCHLORIDE 4 MG: 2 SOLUTION INTRAMUSCULAR; INTRAVENOUS at 00:53

## 2019-08-04 RX ADMIN — AMPICILLIN SODIUM AND SULBACTAM SODIUM 3 G: 2; 1 INJECTION, POWDER, FOR SOLUTION INTRAMUSCULAR; INTRAVENOUS at 15:09

## 2019-08-04 RX ADMIN — POTASSIUM CHLORIDE 10 MEQ: 7.46 INJECTION, SOLUTION INTRAVENOUS at 10:01

## 2019-08-04 RX ADMIN — POTASSIUM CHLORIDE 10 MEQ: 7.46 INJECTION, SOLUTION INTRAVENOUS at 01:45

## 2019-08-04 RX ADMIN — DEXTROSE AND SODIUM CHLORIDE: 5; 450 INJECTION, SOLUTION INTRAVENOUS at 10:02

## 2019-08-04 RX ADMIN — HEPARIN SODIUM 5000 UNITS: 10000 INJECTION INTRAVENOUS; SUBCUTANEOUS at 10:02

## 2019-08-04 RX ADMIN — AMPICILLIN SODIUM AND SULBACTAM SODIUM 3 G: 2; 1 INJECTION, POWDER, FOR SOLUTION INTRAMUSCULAR; INTRAVENOUS at 22:50

## 2019-08-04 RX ADMIN — INSULIN LISPRO 10 UNITS: 100 INJECTION, SOLUTION INTRAVENOUS; SUBCUTANEOUS at 12:38

## 2019-08-04 RX ADMIN — HEPARIN SODIUM 5000 UNITS: 10000 INJECTION INTRAVENOUS; SUBCUTANEOUS at 02:18

## 2019-08-04 RX ADMIN — AMPICILLIN SODIUM AND SULBACTAM SODIUM 3 G: 2; 1 INJECTION, POWDER, FOR SOLUTION INTRAMUSCULAR; INTRAVENOUS at 06:42

## 2019-08-04 RX ADMIN — THIAMINE HYDROCHLORIDE 100 MG: 100 INJECTION, SOLUTION INTRAMUSCULAR; INTRAVENOUS at 10:17

## 2019-08-04 RX ADMIN — SODIUM PHOSPHATE, MONOBASIC, MONOHYDRATE 10 MMOL: 276; 142 INJECTION, SOLUTION INTRAVENOUS at 10:01

## 2019-08-04 RX ADMIN — DEXTROSE MONOHYDRATE: 50 INJECTION, SOLUTION INTRAVENOUS at 16:17

## 2019-08-04 RX ADMIN — Medication 10 ML: at 21:27

## 2019-08-04 RX ADMIN — INSULIN LISPRO 2 UNITS: 100 INJECTION, SOLUTION INTRAVENOUS; SUBCUTANEOUS at 21:25

## 2019-08-04 RX ADMIN — POTASSIUM CHLORIDE 10 MEQ: 7.46 INJECTION, SOLUTION INTRAVENOUS at 02:18

## 2019-08-04 RX ADMIN — DEXTROSE AND SODIUM CHLORIDE: 5; 450 INJECTION, SOLUTION INTRAVENOUS at 11:04

## 2019-08-04 RX ADMIN — POTASSIUM CHLORIDE 10 MEQ: 7.46 INJECTION, SOLUTION INTRAVENOUS at 11:04

## 2019-08-04 RX ADMIN — POTASSIUM CHLORIDE 10 MEQ: 7.46 INJECTION, SOLUTION INTRAVENOUS at 06:40

## 2019-08-04 RX ADMIN — POTASSIUM CHLORIDE 10 MEQ: 7.46 INJECTION, SOLUTION INTRAVENOUS at 06:05

## 2019-08-04 RX ADMIN — FOMEPIZOLE 1110 MG: 1 INJECTION, SOLUTION INTRAVENOUS at 12:32

## 2019-08-04 RX ADMIN — POTASSIUM CHLORIDE 10 MEQ: 7.46 INJECTION, SOLUTION INTRAVENOUS at 01:07

## 2019-08-04 RX ADMIN — INSULIN GLARGINE 20 UNITS: 100 INJECTION, SOLUTION SUBCUTANEOUS at 10:02

## 2019-08-04 RX ADMIN — DEXTROSE AND SODIUM CHLORIDE: 5; 450 INJECTION, SOLUTION INTRAVENOUS at 04:17

## 2019-08-04 RX ADMIN — INSULIN LISPRO 8 UNITS: 100 INJECTION, SOLUTION INTRAVENOUS; SUBCUTANEOUS at 17:11

## 2019-08-04 ASSESSMENT — PAIN SCALES - GENERAL
PAINLEVEL_OUTOF10: 0

## 2019-08-04 NOTE — FLOWSHEET NOTE
08/04/19 0800   Restraint Order   Length of Order 24   Order Upon Application Yes   Face to Face Yes   Assessment   Less Restrictive Alternative RP;DE;RO;VR   Special Consideration/Risk Factors N   Justification   Clinical Justification L;E;H   Education   Discontinuation Criteria Absence   Criteria Explained Yes   Patient's Response NL   Family Notification O  (previously notified)   Restraint Monitoring Q60 Minutes   Visual/Safety Check (q 60 mins) AG   Restraint  Monitoring Q2 Hours   Circulation NS   Range of Motion P   Fluids O   Food/Meal N   Elimination UC   Restraint Type   Soft Restraint B Wrist CONTINUED   Vital Signs   Temp 98.5 °F (36.9 °C)   Pulse 111   Resp 28   /72   MAP (mmHg) 95       Pt continues to reach for lines and tubes despite attempts to deter. Restraints continued for pt safety.  Isabella Valle

## 2019-08-04 NOTE — PROGRESS NOTES
clear.  Neck: Supple, with full range of motion. No jugular venous distention. Trachea midline. Respiratory:  cta ant/sup  Cardiovascular:  Regular rate and rhythm with normal S1/S2 without murmurs, rubs or gallops. Abdomen: Soft, non-tender, non-distended with normal bowel sounds. Musculoskeletal:  No clubbing, cyanosis or edema bilaterally. Full range of motion without deformity. Skin: Skin color, texture, turgor normal.  No rashes or lesions. Neurologic:  Oriented to self  Psychiatric:  Alert and oriented, thought content appropriate, normal insight      Labs:   Recent Labs     08/03/19 1650 08/03/19 2006 08/04/19  0417   WBC 24.1* 17.5* 13.6*   HGB 11.5* 11.2* 10.6*   HCT 37.1 33.3* 30.7*    382 346     Recent Labs     08/03/19 2006 08/04/19  0013 08/04/19 0417    148* 151*   K 3.7 4.2 3.8   CL 94* 106 113*   CO2 15* 21* 26   BUN 42* 35* 29*   CREATININE 2.3* 1.7* 1.3*   CALCIUM 7.2* 7.3* 7.1*   PHOS 1.9* 2.3* 2.5     Recent Labs     08/03/19  1650   AST 23   ALT 21   BILITOT <0.2   ALKPHOS 128     Recent Labs     08/03/19  1650   INR 0.9     Recent Labs     08/03/19  1650 08/03/19 2006 08/04/19  0156   CKTOTAL 162 234*  --    TROPONINI  --  <0.01 <0.01       Imaging:  XR CHEST PORTABLE   Final Result      Interstitial opacities are present throughout the right lung which   could indicate peribronchial inflammatory changes or bronchopneumonia. Short-term follow-up may be helpful for further evaluation. XR CHEST PORTABLE    (Results Pending)         Assessment/Plan:  Active Hospital Problems    Diagnosis Date Noted    DKA, type 1, not at goal [facility]) [E10.10] 08/03/2019     29 yo male hx  DKA, Type I DM, Essential HTN, Alcohol abuse and cocaine use. He was found unresponsive by the EMS. H e was not taking his insulin for a wk, sugar fund to have Glucose in the 1800, K 6.5 and anion gap of 45, he started on an insulin drip of 10 units and was transferred to the MICU.  In the MICU was

## 2019-08-04 NOTE — H&P
sounds. Musculoskeletal:  No clubbing, cyanosis or edema bilaterally. Full range of motion without deformity. Skin: Skin color, texture, turgor normal.  No rashes or lesions. Neurologic:  Oriented to self  Psychiatric:  Alert and oriented, thought content appropriate, normal insight      Labs:     Recent Labs     08/03/19 1650 08/03/19 2006   WBC 24.1* 17.5*   HGB 11.5* 11.2*   HCT 37.1 33.3*    382     Recent Labs     08/03/19 1650 08/03/19 2006    141   K 4.1 3.7   CL 84* 94*   CO2 4* 15*   BUN 53* 42*   CREATININE 2.7* 2.3*   CALCIUM 6.7* 7.2*   PHOS 6.7* 1.9*     Recent Labs     08/03/19 1650   AST 23   ALT 21   BILITOT <0.2   ALKPHOS 128     Recent Labs     08/03/19 1650   INR 0.9     Recent Labs     08/03/19 1650 08/03/19 2006   CKTOTAL 162 234*   TROPONINI  --  <0.01       Urinalysis:      Lab Results   Component Value Date    NITRU Negative 08/03/2019    WBCUA 0-1 08/03/2019    BACTERIA RARE 08/03/2019    RBCUA 2-5 08/03/2019    BLOODU MODERATE 08/03/2019    SPECGRAV 1.015 08/03/2019    GLUCOSEU >=1000 08/03/2019       Radiology:       XR CHEST PORTABLE   Final Result      Interstitial opacities are present throughout the right lung which   could indicate peribronchial inflammatory changes or bronchopneumonia. Short-term follow-up may be helpful for further evaluation. ASSESSMENT:    Active Hospital Problems    Diagnosis Date Noted    DKA, type 1, not at goal [facility]) [E10.10] 08/03/2019         31 yo male hx  DKA, Type I DM, Essential HTN, Alcohol abuse and cocaine use. He was found unresponsive by the EMS. REJI colunga was not taking his insulin for a wk, sugar fund to have Glucose in the 1800, K 6.5 and anion gap of 45, he started on an insulin drip of 10 units and was transferred to the MICU. In the MICU was agitated, with an altered mental status and tachypnea. ROS could not be taken due to the patient's condition.      DKA  HYPERKALEMIA  ENCEPHALOPATHY METABOLIC  ETOH ABUSE -

## 2019-08-04 NOTE — CONSULTS
IVPB  15 mg/kg Intravenous Once    sodium chloride flush  10 mL Intravenous 2 times per day    pantoprazole  40 mg Intravenous Daily    And    sodium chloride (PF)  10 mL Intravenous Daily    heparin (porcine)  5,000 Units Subcutaneous Q8H    ampicillin-sulbactam  3 g Intravenous Q8H     Continuous Infusions:   sodium chloride      dextrose 5 % and 0.45 % NaCl 250 mL/hr at 08/04/19 1104    dextrose      sodium chloride Stopped (08/04/19 0415)    dextrose 5 % and 0.45 % NaCl 150 mL/hr at 08/04/19 0417    insulin (HUMAN R) non-weight based infusion Stopped (08/04/19 1104)     PRN Meds:glucose, dextrose, glucagon (rDNA), dextrose, lidocaine, sodium chloride flush, magnesium hydroxide, ondansetron, dextrose, potassium chloride, magnesium sulfate, sodium phosphate IVPB **OR** sodium phosphate IVPB **OR** sodium phosphate IVPB, dextrose 5 % and 0.45 % NaCl    Allergies:  Patient has no known allergies.     Social History:   Social History     Socioeconomic History    Marital status: Single     Spouse name: None    Number of children: None    Years of education: None    Highest education level: None   Occupational History    None   Social Needs    Financial resource strain: None    Food insecurity:     Worry: None     Inability: None    Transportation needs:     Medical: None     Non-medical: None   Tobacco Use    Smoking status: Current Some Day Smoker    Smokeless tobacco: Current User     Types: Chew   Substance and Sexual Activity    Alcohol use: None    Drug use: None    Sexual activity: None   Lifestyle    Physical activity:     Days per week: None     Minutes per session: None    Stress: None   Relationships    Social connections:     Talks on phone: None     Gets together: None     Attends Orthodox service: None     Active member of club or organization: None     Attends meetings of clubs or organizations: None     Relationship status: None    Intimate partner violence:     Fear of sounds to auscultation. Benign to palpation. No masses felt. No hepatosplenomegaly. Extremities: No clubbing, no cyanosis, no edema. Musculoskeletal: Equal and symmetrical  Neurological: No focal except for confusion  Lines: peripheral      CBC+dif:  Recent Labs     08/03/19  1650  08/03/19 2006 08/04/19  0417   WBC 24.1*  --  17.5* 13.6*   HGB 11.5*  --  11.2* 10.6*   HCT 37.1  --  33.3* 30.7*   MCV 93.9  --  86.9 83.7     --  382 346   NEUTROABS  --    < > 12.82* 11.70*    < > = values in this interval not displayed.      No results found for: CRP  No results found for: CRPHS  No results found for: SEDRATE  Lab Results   Component Value Date    ALT 21 08/03/2019    AST 23 08/03/2019    ALKPHOS 128 08/03/2019    BILITOT <0.2 08/03/2019     Lab Results   Component Value Date     08/04/2019    K 4.2 08/04/2019    K 4.1 08/03/2019     08/04/2019    CO2 31 08/04/2019    BUN 25 08/04/2019    CREATININE 1.2 08/04/2019    GFRAA >60 08/04/2019    LABGLOM >60 08/04/2019    GLUCOSE 172 08/04/2019    PROT 5.9 08/03/2019    LABALBU 2.9 08/03/2019    CALCIUM 7.5 08/04/2019    BILITOT <0.2 08/03/2019    ALKPHOS 128 08/03/2019    AST 23 08/03/2019    ALT 21 08/03/2019       Lab Results   Component Value Date    PROTIME 10.0 08/03/2019    INR 0.9 08/03/2019       No results found for: TSH    Lab Results   Component Value Date    COLORU Yellow 08/03/2019    PHUR 5.0 08/03/2019    WBCUA 0-1 08/03/2019    RBCUA 2-5 08/03/2019    BACTERIA RARE 08/03/2019    CLARITYU Clear 08/03/2019    SPECGRAV 1.015 08/03/2019    LEUKOCYTESUR Negative 08/03/2019    UROBILINOGEN 0.2 08/03/2019    BILIRUBINUR SMALL 08/03/2019    BLOODU MODERATE 08/03/2019    GLUCOSEU >=1000 08/03/2019       No results found for: JBF7GCA, BEART, C9LCVQND, PHART, THGBART, MTI2CFR, PO2ART, VAM8YIW  Radiology:  XR CHEST PORTABLE   Final Result   Persistent airspace opacities scattered throughout the   right lung and new airspace opacities in left lower

## 2019-08-04 NOTE — PLAN OF CARE
Problem: Restraint Use - Nonviolent/Non-Self-Destructive Behavior:  Goal: Absence of restraint-related injury  Description  Absence of restraint-related injury  8/4/2019 0655 by Cachorro Whitman RN  Outcome: Met This Shift     Problem: Restraint Use - Nonviolent/Non-Self-Destructive Behavior:  Goal: Absence of restraint indications  Description  Absence of restraint indications  8/4/2019 0655 by Cachorro Whitman RN  Outcome: Not Met This Shift

## 2019-08-05 ENCOUNTER — APPOINTMENT (OUTPATIENT)
Dept: GENERAL RADIOLOGY | Age: 29
DRG: 637 | End: 2019-08-05
Attending: STUDENT IN AN ORGANIZED HEALTH CARE EDUCATION/TRAINING PROGRAM
Payer: MEDICARE

## 2019-08-05 LAB
ANION GAP SERPL CALCULATED.3IONS-SCNC: 10 MMOL/L (ref 7–16)
BASOPHILS ABSOLUTE: 0.02 E9/L (ref 0–0.2)
BASOPHILS RELATIVE PERCENT: 0.2 % (ref 0–2)
BUN BLDV-MCNC: 9 MG/DL (ref 6–20)
CALCIUM SERPL-MCNC: 7.8 MG/DL (ref 8.6–10.2)
CHLORIDE BLD-SCNC: 102 MMOL/L (ref 98–107)
CO2: 26 MMOL/L (ref 22–29)
CREAT SERPL-MCNC: 0.8 MG/DL (ref 0.7–1.2)
EOSINOPHILS ABSOLUTE: 0.03 E9/L (ref 0.05–0.5)
EOSINOPHILS RELATIVE PERCENT: 0.3 % (ref 0–6)
GFR AFRICAN AMERICAN: >60
GFR NON-AFRICAN AMERICAN: >60 ML/MIN/1.73
GLUCOSE BLD-MCNC: 350 MG/DL (ref 74–99)
HAV IGM SER IA-ACNC: NORMAL
HBA1C MFR BLD: 13.9 % (ref 4–5.6)
HCT VFR BLD CALC: 31.8 % (ref 37–54)
HEMOGLOBIN: 10.5 G/DL (ref 12.5–16.5)
HEPATITIS B CORE IGM ANTIBODY: NORMAL
HEPATITIS B SURFACE ANTIGEN INTERPRETATION: NORMAL
HEPATITIS C ANTIBODY INTERPRETATION: NORMAL
HIV-1 AND HIV-2 ANTIBODIES: NORMAL
IMMATURE GRANULOCYTES #: 0.06 E9/L
IMMATURE GRANULOCYTES %: 0.6 % (ref 0–5)
LV EF: 55 %
LVEF MODALITY: NORMAL
LYMPHOCYTES ABSOLUTE: 1.77 E9/L (ref 1.5–4)
LYMPHOCYTES RELATIVE PERCENT: 17.1 % (ref 20–42)
MCH RBC QN AUTO: 29.3 PG (ref 26–35)
MCHC RBC AUTO-ENTMCNC: 33 % (ref 32–34.5)
MCV RBC AUTO: 88.8 FL (ref 80–99.9)
METER GLUCOSE: 228 MG/DL (ref 74–99)
METER GLUCOSE: 246 MG/DL (ref 74–99)
METER GLUCOSE: 263 MG/DL (ref 74–99)
METER GLUCOSE: 301 MG/DL (ref 74–99)
METER GLUCOSE: 318 MG/DL (ref 74–99)
METER GLUCOSE: 332 MG/DL (ref 74–99)
METER GLUCOSE: 349 MG/DL (ref 74–99)
MONOCYTES ABSOLUTE: 0.54 E9/L (ref 0.1–0.95)
MONOCYTES RELATIVE PERCENT: 5.2 % (ref 2–12)
NEUTROPHILS ABSOLUTE: 7.91 E9/L (ref 1.8–7.3)
NEUTROPHILS RELATIVE PERCENT: 76.6 % (ref 43–80)
ORGANISM: ABNORMAL
PDW BLD-RTO: 16.1 FL (ref 11.5–15)
PLATELET # BLD: 259 E9/L (ref 130–450)
PMV BLD AUTO: 10.6 FL (ref 7–12)
POTASSIUM SERPL-SCNC: 4.1 MMOL/L (ref 3.5–5)
RBC # BLD: 3.58 E12/L (ref 3.8–5.8)
SODIUM BLD-SCNC: 138 MMOL/L (ref 132–146)
URINE CULTURE, ROUTINE: NORMAL
WBC # BLD: 10.3 E9/L (ref 4.5–11.5)

## 2019-08-05 PROCEDURE — 6360000002 HC RX W HCPCS: Performed by: INTERNAL MEDICINE

## 2019-08-05 PROCEDURE — 83036 HEMOGLOBIN GLYCOSYLATED A1C: CPT

## 2019-08-05 PROCEDURE — 36415 COLL VENOUS BLD VENIPUNCTURE: CPT

## 2019-08-05 PROCEDURE — 6370000000 HC RX 637 (ALT 250 FOR IP): Performed by: INTERNAL MEDICINE

## 2019-08-05 PROCEDURE — C9113 INJ PANTOPRAZOLE SODIUM, VIA: HCPCS | Performed by: INTERNAL MEDICINE

## 2019-08-05 PROCEDURE — 71045 X-RAY EXAM CHEST 1 VIEW: CPT

## 2019-08-05 PROCEDURE — 2580000003 HC RX 258: Performed by: INTERNAL MEDICINE

## 2019-08-05 PROCEDURE — 99233 SBSQ HOSP IP/OBS HIGH 50: CPT | Performed by: INTERNAL MEDICINE

## 2019-08-05 PROCEDURE — 82962 GLUCOSE BLOOD TEST: CPT

## 2019-08-05 PROCEDURE — 85025 COMPLETE CBC W/AUTO DIFF WBC: CPT

## 2019-08-05 PROCEDURE — 80048 BASIC METABOLIC PNL TOTAL CA: CPT

## 2019-08-05 PROCEDURE — 2060000000 HC ICU INTERMEDIATE R&B

## 2019-08-05 PROCEDURE — 93306 TTE W/DOPPLER COMPLETE: CPT

## 2019-08-05 PROCEDURE — 36592 COLLECT BLOOD FROM PICC: CPT

## 2019-08-05 RX ORDER — LISINOPRIL 20 MG/1
20 TABLET ORAL DAILY
Status: DISCONTINUED | OUTPATIENT
Start: 2019-08-05 | End: 2019-08-06 | Stop reason: HOSPADM

## 2019-08-05 RX ORDER — THIAMINE MONONITRATE (VIT B1) 100 MG
100 TABLET ORAL DAILY
Status: DISCONTINUED | OUTPATIENT
Start: 2019-08-06 | End: 2019-08-06 | Stop reason: HOSPADM

## 2019-08-05 RX ORDER — INSULIN GLARGINE 100 [IU]/ML
20 INJECTION, SOLUTION SUBCUTANEOUS 2 TIMES DAILY
Status: DISCONTINUED | OUTPATIENT
Start: 2019-08-05 | End: 2019-08-06 | Stop reason: HOSPADM

## 2019-08-05 RX ORDER — INSULIN GLARGINE 100 [IU]/ML
15 INJECTION, SOLUTION SUBCUTANEOUS 2 TIMES DAILY
Status: DISCONTINUED | OUTPATIENT
Start: 2019-08-05 | End: 2019-08-05

## 2019-08-05 RX ADMIN — Medication 10 ML: at 22:38

## 2019-08-05 RX ADMIN — INSULIN GLARGINE 20 UNITS: 100 INJECTION, SOLUTION SUBCUTANEOUS at 20:07

## 2019-08-05 RX ADMIN — INSULIN LISPRO 7 UNITS: 100 INJECTION, SOLUTION INTRAVENOUS; SUBCUTANEOUS at 17:29

## 2019-08-05 RX ADMIN — HEPARIN SODIUM 5000 UNITS: 10000 INJECTION INTRAVENOUS; SUBCUTANEOUS at 18:54

## 2019-08-05 RX ADMIN — LISINOPRIL 20 MG: 20 TABLET ORAL at 10:37

## 2019-08-05 RX ADMIN — AMPICILLIN SODIUM AND SULBACTAM SODIUM 3 G: 2; 1 INJECTION, POWDER, FOR SOLUTION INTRAMUSCULAR; INTRAVENOUS at 15:39

## 2019-08-05 RX ADMIN — HEPARIN SODIUM 5000 UNITS: 10000 INJECTION INTRAVENOUS; SUBCUTANEOUS at 10:37

## 2019-08-05 RX ADMIN — INSULIN LISPRO 8 UNITS: 100 INJECTION, SOLUTION INTRAVENOUS; SUBCUTANEOUS at 07:48

## 2019-08-05 RX ADMIN — AMPICILLIN SODIUM AND SULBACTAM SODIUM 3 G: 2; 1 INJECTION, POWDER, FOR SOLUTION INTRAMUSCULAR; INTRAVENOUS at 07:22

## 2019-08-05 RX ADMIN — INSULIN LISPRO 1 UNITS: 100 INJECTION, SOLUTION INTRAVENOUS; SUBCUTANEOUS at 20:07

## 2019-08-05 RX ADMIN — MUPIROCIN: 20 OINTMENT TOPICAL at 22:35

## 2019-08-05 RX ADMIN — INSULIN GLARGINE 20 UNITS: 100 INJECTION, SOLUTION SUBCUTANEOUS at 08:55

## 2019-08-05 RX ADMIN — PANTOPRAZOLE SODIUM 40 MG: 40 INJECTION, POWDER, FOR SOLUTION INTRAVENOUS at 08:56

## 2019-08-05 RX ADMIN — INSULIN LISPRO 4 UNITS: 100 INJECTION, SOLUTION INTRAVENOUS; SUBCUTANEOUS at 11:45

## 2019-08-05 RX ADMIN — INSULIN LISPRO 2 UNITS: 100 INJECTION, SOLUTION INTRAVENOUS; SUBCUTANEOUS at 16:55

## 2019-08-05 RX ADMIN — THIAMINE HYDROCHLORIDE 100 MG: 100 INJECTION, SOLUTION INTRAMUSCULAR; INTRAVENOUS at 08:56

## 2019-08-05 RX ADMIN — Medication 10 ML: at 08:56

## 2019-08-05 RX ADMIN — MUPIROCIN: 20 OINTMENT TOPICAL at 15:45

## 2019-08-05 RX ADMIN — Medication 10 ML: at 08:55

## 2019-08-05 ASSESSMENT — PAIN SCALES - GENERAL
PAINLEVEL_OUTOF10: 0

## 2019-08-05 NOTE — PROGRESS NOTES
[address] [facility]  Department of Internal Medicine   Internal Medicine Residency   MICU Progress Note    Patient:  Ivania Webb 30 y.o. male  MRN: [id number]     Date of Service: 8/5/2019    Allergy: Patient has no known allergies. Subjective   The patient was seen and examined this morning. Patient awake ,alert,orient X 3, following  command. Patient denies any sob,chest pain, abdominal pain, tolerating diet. Objective     VS: BP (!) 127/94   Pulse 87   Temp 98.9 °F (37.2 °C) (Oral)   Resp 28   Ht 5' 5\" (1.651 m)   Wt 165 lb 5.5 oz (75 kg)   SpO2 99%   BMI 27.51 kg/m²    I & O - 24hr:     Intake/Output Summary (Last 24 hours) at 8/5/2019 1905  Last data filed at 8/5/2019 1800  Gross per 24 hour   Intake 3545 ml   Output 4775 ml   Net -1230 ml     ABG:     Lab Results   Component Value Date    PH 7.114 08/03/2019    PCO2 15.9 08/03/2019    PO2 141.1 08/03/2019    HCO3 5.0 08/03/2019    BE -22.4 08/03/2019    THB 12.4 08/03/2019    O2SAT 98.3 08/03/2019     Physical Exam:  · General Appearance: letharic. disoriented x2   · Neck: no adenopathy, no carotid bruit, no JVD, supple, symmetrical, trachea midline and thyroid not enlarged, symmetric, no tenderness/mass/nodules. Very poor dentition  · Lung: clear to auscultation bilaterally  · Heart: regular rate and rhythm, S1, S2 normal, no murmur, click, rub or gallop  · Abdomen: soft, non-tender; bowel sounds normal; no masses,  no organomegaly  · Extremities:  extremities normal, atraumatic, no cyanosis or edema  · Musculoskeletal: No joint swelling, no muscle tenderness. ROM normal in all joints of extremities.    · Neurologic: Mental status: lethargic     Lines     site day    Art line   None    TLC R Fem 8/3/2019   PICC None    Hemoaccess None         Medications     Current Facility-Administered Medications   Medication Dose Route Frequency Provider Last Rate Last Dose    insulin lispro (HUMALOG) injection vial 0-6 Units  0-6 Units Subcutaneous TID  Franca Ontiveros MD   2 Units at 08/05/19 1655    insulin lispro (HUMALOG) injection vial 0-3 Units  0-3 Units Subcutaneous Nightly Franca Ontiveros MD        insulin glargine (LANTUS) injection vial 20 Units  20 Units Subcutaneous BID Franca Ontiveros MD   20 Units at 08/05/19 0855    lisinopril (PRINIVIL;ZESTRIL) tablet 20 mg  20 mg Oral Daily Franca Ontiveros MD   20 mg at 08/05/19 1037    mupirocin (BACTROBAN) 2 % ointment   Topical BID MD Stephen Wareeline Efrain [START ON 8/6/2019] vitamin B-1 (THIAMINE) tablet 100 mg  100 mg Oral Daily Franca Ontiveros MD        insulin lispro (HUMALOG) injection vial 7 Units  7 Units Subcutaneous TID  Franca Ontiveros MD   7 Units at 08/05/19 1729    glucose (GLUTOSE) 40 % oral gel 15 g  15 g Oral PRN Megan Ramey MD        dextrose 50 % IV solution  12.5 g Intravenous PRN Megan Ramey MD        glucagon (rDNA) injection 1 mg  1 mg Intramuscular PRN Megan Ramey MD        dextrose 5 % solution  100 mL/hr Intravenous PRN Megan Ramey MD        lidocaine (XYLOCAINE) 2 % jelly   Topical PRN Sai Harley MD        sodium chloride flush 0.9 % injection 10 mL  10 mL Intravenous 2 times per day Sai Harley MD   10 mL at 08/05/19 0855    sodium chloride flush 0.9 % injection 10 mL  10 mL Intravenous PRN Sai Harley MD        magnesium hydroxide (MILK OF MAGNESIA) 400 MG/5ML suspension 30 mL  30 mL Oral Daily PRN Sai Harley MD        ondansetron (ZOFRAN) injection 4 mg  4 mg Intravenous Q6H PRN Sai Harley MD   4 mg at 08/04/19 0629    heparin (porcine) injection 5,000 Units  5,000 Units Subcutaneous Q8H Sai Harley MD   5,000 Units at 08/05/19 1854    ampicillin-sulbactam (UNASYN) 3 g ivpb minibag  3 g Intravenous Q8H Sai Harley MD   Stopped at 08/05/19 1615       Labs     CBC:   Lab Results   Component Value Date    WBC 10.3 08/05/2019    RBC 3.58 08/05/2019    HGB 10.5 08/05/2019    HCT 31.8 08/05/2019    MCV 88.8 08/05/2019    MCH 29.3 08/05/2019    MCHC 33.0 08/05/2019

## 2019-08-05 NOTE — PROGRESS NOTES
P Quality Flow/Interdisciplinary Rounds Progress Note        Quality Flow Rounds held on August 5, 2019    Disciplines Attending:  Bedside Nurse, Charge nurse, RENZO, OT, PT, , and . Ofelia Amaral was admitted on 8/3/2019  4:14 PM    Anticipated Discharge Date:  Expected Discharge Date: 08/06/19    Disposition:    Alex Score:  Alex Scale Score: 17    Readmission Risk              Risk of Unplanned Readmission:        13           Discussed patient goal for the day, patient clinical progression, and barriers to discharge.   The following Goal(s) of the Day/Commitment(s) have been identified:  Possible transfer, monitor blood sugars and labs      Valeri Max  August 5, 2019

## 2019-08-05 NOTE — PROGRESS NOTES
ID Progress Note                [address], [place], [address] [facility]            Phone [phone]     Fax [phone]      Chief complaint: unresponsiveness. Subjective: The patient was seen and examined. The patient is awake and alert, oriented x4. Afebrile   He denies neck pain, headache, shortness of breath, chest pain. Denies to be a iv drug user, only marijuana. Objective:    Vitals:    08/05/19 1300   BP: (!) 141/106   Pulse: 91   Resp: 28   Temp:    SpO2: 99%     VENT SETTINGS:      General Appearance:    Awake, alert , no acute distress. HEENT:    Poor dentition.  Normocephalic,PERRL,neck supple, no JVD, mucosa moist, no thrush   Lungs:     Clear to auscultation bilaterally, no wheeze , crackles   Heart:    Regular rate and rhythm, no murmur   Abdomen:     Soft, non-tender, not distended  bowel sounds present,   Extremities:   No edema,no open wound,no erythema, non  tender   Skin:   no rashes or lesions     Labs:  Recent Labs     08/03/19 2006 08/04/19 0417 08/05/19 0415   WBC 17.5* 13.6* 10.3   RBC 3.83 3.67* 3.58*   HGB 11.2* 10.6* 10.5*   HCT 33.3* 30.7* 31.8*   MCV 86.9 83.7 88.8   MCH 29.2 28.9 29.3   MCHC 33.6 34.5 33.0   RDW 14.6 14.7 16.1*    346 259   MPV 10.7 10.2 10.6     CMP:    Lab Results   Component Value Date     08/05/2019    K 4.1 08/05/2019    K 4.1 08/03/2019     08/05/2019    CO2 26 08/05/2019    BUN 9 08/05/2019    CREATININE 0.8 08/05/2019    GFRAA >60 08/05/2019    LABGLOM >60 08/05/2019    GLUCOSE 350 08/05/2019    PROT 5.9 08/03/2019    LABALBU 2.9 08/03/2019    CALCIUM 7.8 08/05/2019    BILITOT <0.2 08/03/2019    ALKPHOS 128 08/03/2019    AST 23 08/03/2019    ALT 21 08/03/2019          Microbiology :  Recent Labs     08/03/19  2101   BC 24 Hours- no growth     Recent Labs     08/03/19  2101   BLOODCULT2 24 Hours- no growth     Recent Labs     08/03/19  1719   LABURIN Growth not present, incubation continues     No

## 2019-08-06 VITALS
OXYGEN SATURATION: 98 % | DIASTOLIC BLOOD PRESSURE: 86 MMHG | TEMPERATURE: 98.2 F | RESPIRATION RATE: 20 BRPM | HEIGHT: 65 IN | BODY MASS INDEX: 27.55 KG/M2 | WEIGHT: 165.34 LBS | HEART RATE: 80 BPM | SYSTOLIC BLOOD PRESSURE: 121 MMHG

## 2019-08-06 LAB
ANION GAP SERPL CALCULATED.3IONS-SCNC: 11 MMOL/L (ref 7–16)
BASOPHILS ABSOLUTE: 0.02 E9/L (ref 0–0.2)
BASOPHILS RELATIVE PERCENT: 0.3 % (ref 0–2)
BUN BLDV-MCNC: 8 MG/DL (ref 6–20)
CALCIUM SERPL-MCNC: 8.4 MG/DL (ref 8.6–10.2)
CHLORIDE BLD-SCNC: 102 MMOL/L (ref 98–107)
CO2: 27 MMOL/L (ref 22–29)
CREAT SERPL-MCNC: 0.8 MG/DL (ref 0.7–1.2)
EOSINOPHILS ABSOLUTE: 0.05 E9/L (ref 0.05–0.5)
EOSINOPHILS RELATIVE PERCENT: 0.7 % (ref 0–6)
GFR AFRICAN AMERICAN: >60
GFR NON-AFRICAN AMERICAN: >60 ML/MIN/1.73
GLUCOSE BLD-MCNC: 264 MG/DL (ref 74–99)
HCT VFR BLD CALC: 32.3 % (ref 37–54)
HEMOGLOBIN: 10.4 G/DL (ref 12.5–16.5)
IMMATURE GRANULOCYTES #: 0.02 E9/L
IMMATURE GRANULOCYTES %: 0.3 % (ref 0–5)
LYMPHOCYTES ABSOLUTE: 2.07 E9/L (ref 1.5–4)
LYMPHOCYTES RELATIVE PERCENT: 29.7 % (ref 20–42)
MCH RBC QN AUTO: 28.6 PG (ref 26–35)
MCHC RBC AUTO-ENTMCNC: 32.2 % (ref 32–34.5)
MCV RBC AUTO: 88.7 FL (ref 80–99.9)
METER GLUCOSE: 165 MG/DL (ref 74–99)
METER GLUCOSE: 223 MG/DL (ref 74–99)
MONOCYTES ABSOLUTE: 0.41 E9/L (ref 0.1–0.95)
MONOCYTES RELATIVE PERCENT: 5.9 % (ref 2–12)
NEUTROPHILS ABSOLUTE: 4.4 E9/L (ref 1.8–7.3)
NEUTROPHILS RELATIVE PERCENT: 63.1 % (ref 43–80)
PDW BLD-RTO: 15.9 FL (ref 11.5–15)
PLATELET # BLD: 223 E9/L (ref 130–450)
PMV BLD AUTO: 10 FL (ref 7–12)
POTASSIUM SERPL-SCNC: 3.7 MMOL/L (ref 3.5–5)
RBC # BLD: 3.64 E12/L (ref 3.8–5.8)
SODIUM BLD-SCNC: 140 MMOL/L (ref 132–146)
WBC # BLD: 7 E9/L (ref 4.5–11.5)

## 2019-08-06 PROCEDURE — 80048 BASIC METABOLIC PNL TOTAL CA: CPT

## 2019-08-06 PROCEDURE — 85025 COMPLETE CBC W/AUTO DIFF WBC: CPT

## 2019-08-06 PROCEDURE — 2580000003 HC RX 258: Performed by: INTERNAL MEDICINE

## 2019-08-06 PROCEDURE — 82962 GLUCOSE BLOOD TEST: CPT

## 2019-08-06 PROCEDURE — 36415 COLL VENOUS BLD VENIPUNCTURE: CPT

## 2019-08-06 PROCEDURE — 6370000000 HC RX 637 (ALT 250 FOR IP): Performed by: INTERNAL MEDICINE

## 2019-08-06 PROCEDURE — 6360000002 HC RX W HCPCS: Performed by: INTERNAL MEDICINE

## 2019-08-06 RX ORDER — INSULIN GLARGINE 100 [IU]/ML
20 INJECTION, SOLUTION SUBCUTANEOUS 2 TIMES DAILY
Qty: 1 VIAL | Refills: 3 | Status: SHIPPED | OUTPATIENT
Start: 2019-08-06

## 2019-08-06 RX ORDER — LANCETS 30 GAUGE
1 EACH MISCELLANEOUS DAILY
Qty: 300 EACH | Refills: 1 | Status: SHIPPED | OUTPATIENT
Start: 2019-08-06

## 2019-08-06 RX ORDER — LISINOPRIL 20 MG/1
20 TABLET ORAL DAILY
Qty: 30 TABLET | Refills: 3 | Status: SHIPPED | OUTPATIENT
Start: 2019-08-07

## 2019-08-06 RX ORDER — DOXYCYCLINE HYCLATE 100 MG/1
100 CAPSULE ORAL EVERY 12 HOURS SCHEDULED
Status: DISCONTINUED | OUTPATIENT
Start: 2019-08-06 | End: 2019-08-06 | Stop reason: HOSPADM

## 2019-08-06 RX ORDER — DOXYCYCLINE HYCLATE 100 MG/1
100 CAPSULE ORAL EVERY 12 HOURS SCHEDULED
Qty: 14 CAPSULE | Refills: 0 | Status: SHIPPED | OUTPATIENT
Start: 2019-08-06 | End: 2019-08-13

## 2019-08-06 RX ORDER — AMOXICILLIN AND CLAVULANATE POTASSIUM 600; 42.9 MG/5ML; MG/5ML
1800 POWDER, FOR SUSPENSION ORAL EVERY 12 HOURS SCHEDULED
Status: DISCONTINUED | OUTPATIENT
Start: 2019-08-06 | End: 2019-08-06 | Stop reason: HOSPADM

## 2019-08-06 RX ORDER — AMOXICILLIN AND CLAVULANATE POTASSIUM 600; 42.9 MG/5ML; MG/5ML
1800 POWDER, FOR SUSPENSION ORAL EVERY 12 HOURS SCHEDULED
Qty: 210 ML | Refills: 0 | Status: SHIPPED | OUTPATIENT
Start: 2019-08-06 | End: 2019-08-13

## 2019-08-06 RX ORDER — LANOLIN ALCOHOL/MO/W.PET/CERES
100 CREAM (GRAM) TOPICAL DAILY
Qty: 30 TABLET | Refills: 3 | Status: SHIPPED | OUTPATIENT
Start: 2019-08-07

## 2019-08-06 RX ADMIN — AMPICILLIN SODIUM AND SULBACTAM SODIUM 3 G: 2; 1 INJECTION, POWDER, FOR SOLUTION INTRAMUSCULAR; INTRAVENOUS at 00:20

## 2019-08-06 RX ADMIN — LISINOPRIL 20 MG: 20 TABLET ORAL at 09:09

## 2019-08-06 RX ADMIN — AMPICILLIN SODIUM AND SULBACTAM SODIUM 3 G: 2; 1 INJECTION, POWDER, FOR SOLUTION INTRAMUSCULAR; INTRAVENOUS at 09:08

## 2019-08-06 RX ADMIN — INSULIN LISPRO 7 UNITS: 100 INJECTION, SOLUTION INTRAVENOUS; SUBCUTANEOUS at 09:15

## 2019-08-06 RX ADMIN — Medication 10 ML: at 09:09

## 2019-08-06 RX ADMIN — INSULIN GLARGINE 20 UNITS: 100 INJECTION, SOLUTION SUBCUTANEOUS at 09:11

## 2019-08-06 RX ADMIN — MUPIROCIN: 20 OINTMENT TOPICAL at 09:10

## 2019-08-06 RX ADMIN — INSULIN LISPRO 7 UNITS: 100 INJECTION, SOLUTION INTRAVENOUS; SUBCUTANEOUS at 11:48

## 2019-08-06 RX ADMIN — Medication 100 MG: at 09:09

## 2019-08-06 RX ADMIN — INSULIN LISPRO 2 UNITS: 100 INJECTION, SOLUTION INTRAVENOUS; SUBCUTANEOUS at 09:11

## 2019-08-06 RX ADMIN — INSULIN LISPRO 1 UNITS: 100 INJECTION, SOLUTION INTRAVENOUS; SUBCUTANEOUS at 11:49

## 2019-08-06 RX ADMIN — HEPARIN SODIUM 5000 UNITS: 10000 INJECTION INTRAVENOUS; SUBCUTANEOUS at 05:22

## 2019-08-06 ASSESSMENT — PAIN SCALES - GENERAL
PAINLEVEL_OUTOF10: 0
PAINLEVEL_OUTOF10: 0

## 2019-08-06 NOTE — PROGRESS NOTES
[address] [place] [address] [facility]    Consultation Note     Admit Date: 8/3/2019  4:14 PM    Reason for Consult:   Possible sepsis  Attending Physician:  Jonathon Oneill MD     Chief Complaint: Found unresponsive    HISTORY OF PRESENT ILLNESS:   The patient is a 30 y.o.  man not known to the Infectious Diseases service. The patient is admitted to the intensive care unit at [facility] after transfer from [facility].  At this point the patient is awake and answers questions but it seems like he is not telling the truth cannot remember where he was born or even what stated it was. He says he lives with his parents and he says his parents called the emergency transport service. 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 He denies IV drug abuse but there is suspicion that there is underlying IV drug abuse as well he has been converted from insulin drip he has not eaten at this point but he is quite stable except for the CNS confusion. He has been afebrile he had presented with renal insufficiency with a BUN of 42 and a creatinine 2.3 and a white count of 17. Chest radiograph did not show any clear infiltrates on admission he was started on Unasyn. His drug screen was triple usage screened including cocaine cannabinoids and benzodiazepines. Spoke with [facility] blood cultures were not drawn there and the current blood cultures here are negative. In the emergency room he was hypotensive with out  any fevers. He is not on any pressors at this time and responded to fluids and insulin drip. Past Medical History:    No past medical history on file. Past Surgical History:    No past surgical history on file.   Current Medications:   Scheduled Meds:   amoxicillin-clavulanate  1,800 mg Oral 2 times per day    doxycycline hyclate  100 mg Oral 2 times per day    insulin lispro  0-6 Units Subcutaneous TID WC    insulin lispro  0-3 Units Subcutaneous Nightly    or night sweats. No loss of weight. EYES:  No double vision or drainage from eyes, ears or throat. HEENT:  No neck stiffness. No dysphagia. No drainage from eyes, ears or throat  RESPIRATORY:  No cough, productive sputum or hemoptysis. CARDIOVASCULAR:  No chest pain, palpitations, orthopnea or dyspnea on exertion. GASTROINTESTINAL:  No nausea, vomiting, diarrhea or constipation or hematochezia   GENITOURINARY:  No frequency burning dysuria or hematuria. INTEGUMENT/BREAST:  No rash or breast masses. HEMATOLOGIC/LYMPHATIC:  No lymphadenopathy or blood dyscrasics. ALLERGIC/IMMUNOLOGIC:  No anaphylaxis. ENDOCRINE:  No polyuria or polydipsia or temperature intolerance. MUSCULOSKELETAL:  No myalgia or arthralgia. Full ROM. NEUROLOGICAL:  No focal motor sensory deficit. BEHAVIOR/PSYCH:  No psychosis. PHYSICAL EXAM:    Vitals:    /86   Pulse 80   Temp 98.2 °F (36.8 °C) (Temporal)   Resp 20   Ht 5' 5\" (1.651 m)   Wt 165 lb 5.5 oz (75 kg)   SpO2 98%   BMI 27.51 kg/m²   Constitutional: The patient is awake, confused and denying possibly denying information   skin: Warm and dry. No rashes were noted. HEENT: Eyes show round, and reactive pupils. No jaundice. Moist mucous membranes, no ulcerations, no thrush. Neck: Supple to movements. No lymphadenopathy. Chest: No use of accessory muscles to breathe. Symmetrical expansion. Auscultation reveals no wheezing, crackles, or rhonchi. Cardiovascular: S1 and S2 are rhythmic and regular. No murmurs appreciated. Abdomen: Positive bowel sounds to auscultation. Benign to palpation. No masses felt. No hepatosplenomegaly. Extremities: No clubbing, no cyanosis, no edema.   Musculoskeletal: Equal and symmetrical  Neurological: No focal except for confusion  Lines: peripheral      CBC+dif:  Recent Labs     08/04/19  0417 08/05/19  0415 08/06/19  0525   WBC 13.6* 10.3 7.0   HGB 10.6* 10.5* 10.4*   HCT 30.7* 31.8* 32.3*   MCV 83.7 88.8 88.7    259 223   NEUTROABS 11.70* 7.91* 4.40     No results found for: CRP  No results found for: CRPHS  No results found for: SEDRATE  Lab Results   Component Value Date    ALT 21 08/03/2019    AST 23 08/03/2019    ALKPHOS 128 08/03/2019    BILITOT <0.2 08/03/2019     Lab Results   Component Value Date     08/06/2019    K 3.7 08/06/2019    K 4.1 08/03/2019     08/06/2019    CO2 27 08/06/2019    BUN 8 08/06/2019    CREATININE 0.8 08/06/2019    GFRAA >60 08/06/2019    LABGLOM >60 08/06/2019    GLUCOSE 264 08/06/2019    PROT 5.9 08/03/2019    LABALBU 2.9 08/03/2019    CALCIUM 8.4 08/06/2019    BILITOT <0.2 08/03/2019    ALKPHOS 128 08/03/2019    AST 23 08/03/2019    ALT 21 08/03/2019       Lab Results   Component Value Date    PROTIME 10.0 08/03/2019    INR 0.9 08/03/2019       No results found for: TSH    Lab Results   Component Value Date    COLORU Yellow 08/03/2019    PHUR 5.0 08/03/2019    WBCUA 0-1 08/03/2019    RBCUA 2-5 08/03/2019    BACTERIA RARE 08/03/2019    CLARITYU Clear 08/03/2019    SPECGRAV 1.015 08/03/2019    LEUKOCYTESUR Negative 08/03/2019    UROBILINOGEN 0.2 08/03/2019    BILIRUBINUR SMALL 08/03/2019    BLOODU MODERATE 08/03/2019    GLUCOSEU >=1000 08/03/2019       No results found for: VCL6NFZ, BEART, R8ZCSPQI, PHART, THGBART, BTN3LIS, PO2ART, SKM4TIW  Radiology:  XR CHEST PORTABLE   Final Result      Infiltrate seen in the right lower lung. XR CHEST PORTABLE   Final Result   Persistent airspace opacities scattered throughout the   right lung and new airspace opacities in left lower lung, likely   representing infectious or inflammatory changes. XR CHEST PORTABLE   Final Result      Interstitial opacities are present throughout the right lung which   could indicate peribronchial inflammatory changes or bronchopneumonia. Short-term follow-up may be helpful for further evaluation.           Microbiology:  Pending  Recent Labs     08/03/19  2101   BC 24 Hours- no growth Recent Labs     08/03/19  1700   ORG MRSA nasal colonization*     Recent Labs     08/03/19  2101   BLOODCULT2 24 Hours- no growth     No results for input(s): STREPNEUMAGU in the last 72 hours. No results for input(s): LP1UAG in the last 72 hours. No results for input(s): ASO in the last 72 hours. No results for input(s): CULTRESP in the last 72 hours. Assessment:  · Leukocytosis multifactorial including:resolved         Severe volume contraction- resolved          Hyperglycemia  - controled    \          Possible aspiration- improved  Plan:    · Cont continue c augmentin and doxy po  · Screen hepatitis and HIV non reactive  · Check cultures  · Baseline ESR, CRP  · Monitor labs  · Will follow with you    Thank you for having us see this patient in consultation. I will be discussing this case with the treating physicians.       Electronically signed by Sherlyn Dawson MD on 8/6/2019 at 12:13 PM

## 2019-08-07 LAB
7-AMINOCLONAZEPAM, URINE: <5 NG/ML
ALPHA-HYDROXYALPRAZOLAM, URINE: <5 NG/ML
ALPHA-HYDROXYMIDAZOLAM, URINE: 662 NG/ML
ALPRAZOLAM, URINE: <5 NG/ML
CHLORDIAZEPOXIDE, URINE: <20 NG/ML
CLONAZEPAM, URINE: <5 NG/ML
COCAINE, CONFIRM, URINE: 671 NG/ML
DIAZEPAM, URINE: <20 NG/ML
LORAZEPAM, URINE: <20 NG/ML
MIDAZOLAM, URINE: <20 NG/ML
NORDIAZEPAM, URINE: <20 NG/ML
OXAZEPAM, URINE: <20 NG/ML
TEMAZEPAM, URINE: <20 NG/ML

## 2019-08-07 NOTE — PROGRESS NOTES
CLINICAL PHARMACY NOTE: MEDS TO [address] Select Patient?: No  Total # of Prescriptions Filled: 8   The following medications were delivered to the patient:  · Leader insulin syringes 30x5/16  · lantus 100unit/ml  · Lisinopril 20mg  · Mupirocin 2%  · humalog 100  · onetouch ultra soft lancets  · Doxycycline [id number]  · amoxillin-clavulanate 600-42.9mg/5mL Oral Suspension  Total # of Interventions Completed: 5  Time Spent (min): 45    Additional Documentation:  Vitamin b-100

## 2019-08-08 LAB
BLOOD CULTURE, ROUTINE: NORMAL
CULTURE, BLOOD 2: NORMAL

## 2019-08-09 LAB — CANNABINOIDS CONF, URINE: 97 NG/ML

## 2019-08-24 NOTE — DISCHARGE SUMMARY
sensory/motor deficits. Cranial nerves: II-XII intact, grossly non-focal.  Psychiatric: Alert and oriented, thought content appropriate, normal insight      Consults:     IP CONSULT TO CRITICAL CARE  IP CONSULT TO INFECTIOUS DISEASES    Significant Diagnostic Studies:     XR CHEST PORTABLE   Final Result      Infiltrate seen in the right lower lung. XR CHEST PORTABLE   Final Result   Persistent airspace opacities scattered throughout the   right lung and new airspace opacities in left lower lung, likely   representing infectious or inflammatory changes. XR CHEST PORTABLE   Final Result      Interstitial opacities are present throughout the right lung which   could indicate peribronchial inflammatory changes or bronchopneumonia. Short-term follow-up may be helpful for further evaluation. Disposition:  home     Discharge Instructions/Follow-up:  Keep scheduled follow up appointments. Take medications as prescribed     Code Status:  Prior     Activity: activity as tolerated    Diet: diabetic diet    Labs:  For convenience and continuity at follow-up the following most recent labs are provided:      CBC:    Lab Results   Component Value Date    WBC 7.0 08/06/2019    HGB 10.4 08/06/2019    HCT 32.3 08/06/2019     08/06/2019       Renal:    Lab Results   Component Value Date     08/06/2019    K 3.7 08/06/2019    K 4.1 08/03/2019     08/06/2019    CO2 27 08/06/2019    BUN 8 08/06/2019    CREATININE 0.8 08/06/2019    CALCIUM 8.4 08/06/2019    PHOS 2.5 08/04/2019       Discharge Medications:     Discharge Medication List as of 8/6/2019  4:42 PM           Details   amoxicillin-clavulanate (AUGMENTIN-ES) 600-42.9 MG/5ML suspension Take 15 mLs by mouth every 12 hours for 7 days, Disp-210 mL, R-0Print      doxycycline hyclate (VIBRAMYCIN) 100 MG capsule Take 1 capsule by mouth every 12 hours for 7 days, Disp-14 capsule, R-0Print      insulin glargine (LANTUS) 100 UNIT/ML injection vial Inject 20 Units into the skin 2 times daily, Disp-1 vial, R-3Normal      !! insulin lispro (HUMALOG) 100 UNIT/ML injection vial Inject 0-3 Units into the skin nightly, Disp-1 vial, R-3Normal      !! insulin lispro (HUMALOG) 100 UNIT/ML injection vial Inject 0-6 Units into the skin 3 times daily (with meals), Disp-1 vial, R-3Normal      !! insulin lispro (HUMALOG) 100 UNIT/ML injection vial Inject 7 Units into the skin 3 times daily (with meals), Disp-1 vial, R-3Normal      lisinopril (PRINIVIL;ZESTRIL) 20 MG tablet Take 1 tablet by mouth daily, Disp-30 tablet, R-3Normal      mupirocin (BACTROBAN) 2 % ointment Apply topically 3 times daily. , Disp-3 g, R-0, Normal      vitamin B-1 100 MG tablet Take 1 tablet by mouth daily, Disp-30 tablet, R-3Normal      Lancets MISC DAILY Starting Tue 8/6/2019, Disp-300 each, R-1, Normal      Insulin Syringes, Disposable, U-100 1 ML MISC DAILY Starting Tue 8/6/2019, Disp-100 each, R-3, Normal       !! - Potential duplicate medications found. Please discuss with provider. Time Spent on discharge is more than 45 minutes in the examination, evaluation, counseling and review of medications and discharge plan.       Signed:    Filippo Kumar MD   8/24/2019